# Patient Record
Sex: FEMALE | Race: BLACK OR AFRICAN AMERICAN
[De-identification: names, ages, dates, MRNs, and addresses within clinical notes are randomized per-mention and may not be internally consistent; named-entity substitution may affect disease eponyms.]

---

## 2017-02-10 ENCOUNTER — HOSPITAL ENCOUNTER (OUTPATIENT)
Dept: HOSPITAL 61 - MAMMO | Age: 67
Discharge: HOME | End: 2017-02-10
Attending: INTERNAL MEDICINE
Payer: MEDICARE

## 2017-02-10 DIAGNOSIS — Z12.31: Primary | ICD-10-CM

## 2017-02-10 NOTE — RAD
DATE: 2/10/2017



EXAM: DIGITAL SCREEN BILAT W/CAD



HISTORY: Screening



COMPARISON: 8/28/2015



This study was interpreted with the benefit of Computerized Aided Detection

(CAD).



FINDINGS:



The breast parenchyma Is heterogeneiously dense, which could reduce

sensitivity of mammography. Breast parenchyma level C. 



 There is little if any compression. There is, additionally, motion. The study

is essentially nondiagnostic. A gross abnormality is not seen  







IMPRESSION: Essentially nondiagnostic study. No gross abnormality seen



BI-RADS CATEGORY: 2 BENIGN FINDING(S)



RECOMMENDED FOLLOW-UP: CLIN  FOLLOW UP IMAGING AS CLINICALLY INDICATED



PQ[employer] compliance statement: Patient information was entered into a reminder

system with a target due date as clinically warranted for the next mammogram.



Mammography is a sensitive method for finding small breast cancers, but it

does not detect them all and is not a substitute for careful clinical

examination.  A negative mammogram does not negate a clinically suspicious

finding and should not result in delay in biopsying a clinically suspicious

abnormality.



"Our facility is accredited by the [employer] Mammography

Program."

## 2018-04-27 ENCOUNTER — HOSPITAL ENCOUNTER (OUTPATIENT)
Dept: HOSPITAL 61 - KCIC CT | Age: 68
Discharge: HOME | End: 2018-04-27
Attending: INTERNAL MEDICINE
Payer: MEDICARE

## 2018-04-27 DIAGNOSIS — K31.89: ICD-10-CM

## 2018-04-27 DIAGNOSIS — K59.09: Primary | ICD-10-CM

## 2018-04-27 DIAGNOSIS — N28.1: ICD-10-CM

## 2018-04-27 LAB — CREAT BLD-MCNC: 0.6 MG/DL (ref 0.6–1.1)

## 2018-04-27 PROCEDURE — 74177 CT ABD & PELVIS W/CONTRAST: CPT

## 2018-04-27 PROCEDURE — 82565 ASSAY OF CREATININE: CPT

## 2018-04-27 RX ADMIN — IOHEXOL 1 ML: 300 INJECTION, SOLUTION INTRAVENOUS at 11:38

## 2018-04-27 RX ADMIN — IOHEXOL 1 ML: 240 INJECTION, SOLUTION INTRATHECAL; INTRAVASCULAR; INTRAVENOUS; ORAL at 10:20

## 2018-07-17 ENCOUNTER — HOSPITAL ENCOUNTER (OUTPATIENT)
Dept: HOSPITAL 61 - ENDOS | Age: 68
Discharge: HOME | End: 2018-07-17
Attending: INTERNAL MEDICINE
Payer: MEDICARE

## 2018-07-17 DIAGNOSIS — Z53.8: ICD-10-CM

## 2018-07-17 DIAGNOSIS — Z98.890: ICD-10-CM

## 2018-07-17 DIAGNOSIS — F20.9: ICD-10-CM

## 2018-07-17 DIAGNOSIS — K21.9: ICD-10-CM

## 2018-07-17 DIAGNOSIS — F32.9: ICD-10-CM

## 2018-07-17 DIAGNOSIS — K22.2: Primary | ICD-10-CM

## 2018-07-17 DIAGNOSIS — Z79.899: ICD-10-CM
